# Patient Record
Sex: FEMALE | Race: WHITE | NOT HISPANIC OR LATINO | Employment: OTHER | URBAN - METROPOLITAN AREA
[De-identification: names, ages, dates, MRNs, and addresses within clinical notes are randomized per-mention and may not be internally consistent; named-entity substitution may affect disease eponyms.]

---

## 2018-03-28 ENCOUNTER — ESTABLISHED (OUTPATIENT)
Dept: URBAN - METROPOLITAN AREA CLINIC 35 | Facility: CLINIC | Age: 78
End: 2018-03-28

## 2018-03-28 DIAGNOSIS — Z96.1: ICD-10-CM

## 2018-03-28 DIAGNOSIS — H04.123: ICD-10-CM

## 2018-03-28 PROCEDURE — 1036F TOBACCO NON-USER: CPT

## 2018-03-28 PROCEDURE — 92014 COMPRE OPH EXAM EST PT 1/>: CPT

## 2018-03-28 PROCEDURE — G8427 DOCREV CUR MEDS BY ELIG CLIN: HCPCS

## 2018-03-28 PROCEDURE — G8785 BP SCRN NO PERF AT INTERVAL: HCPCS

## 2018-03-28 PROCEDURE — 92015 DETERMINE REFRACTIVE STATE: CPT

## 2018-03-28 ASSESSMENT — VISUAL ACUITY
OS_SC: 20/25
OS_CC: J1
OD_CC: J1
OD_SC: 20/25

## 2018-03-28 ASSESSMENT — TONOMETRY
OD_IOP_MMHG: 16
OS_IOP_MMHG: 16

## 2019-04-03 ENCOUNTER — ESTABLISHED PATIENT (OUTPATIENT)
Dept: URBAN - METROPOLITAN AREA CLINIC 35 | Facility: CLINIC | Age: 79
End: 2019-04-03

## 2019-04-03 DIAGNOSIS — H04.123: ICD-10-CM

## 2019-04-03 DIAGNOSIS — Z96.1: ICD-10-CM

## 2019-04-03 DIAGNOSIS — H35.363: ICD-10-CM

## 2019-04-03 DIAGNOSIS — H26.491: ICD-10-CM

## 2019-04-03 PROCEDURE — 92014 COMPRE OPH EXAM EST PT 1/>: CPT

## 2019-04-03 PROCEDURE — 92015 DETERMINE REFRACTIVE STATE: CPT

## 2019-04-03 ASSESSMENT — VISUAL ACUITY
OS_SC: 20/30-1
OD_SC: J4
OS_BAT: 20/60 WITH MR
OS_SC: J12
OD_SC: 20/200

## 2019-04-03 ASSESSMENT — TONOMETRY
OS_IOP_MMHG: 16
OD_IOP_MMHG: 16

## 2019-04-18 ENCOUNTER — SURGERY/PROCEDURE (OUTPATIENT)
Dept: URBAN - METROPOLITAN AREA SURGERY 14 | Facility: SURGERY | Age: 79
End: 2019-04-18

## 2019-04-18 ENCOUNTER — H&P (OUTPATIENT)
Dept: URBAN - METROPOLITAN AREA CLINIC 39 | Facility: CLINIC | Age: 79
End: 2019-04-18

## 2019-04-18 DIAGNOSIS — H26.491: ICD-10-CM

## 2019-04-18 DIAGNOSIS — H35.363: ICD-10-CM

## 2019-04-18 DIAGNOSIS — Z96.1: ICD-10-CM

## 2019-04-18 PROCEDURE — 99499 UNLISTED E&M SERVICE: CPT

## 2019-04-18 PROCEDURE — 66821 AFTER CATARACT LASER SURGERY: CPT

## 2019-04-22 ENCOUNTER — YAG POST-OP (OUTPATIENT)
Dept: URBAN - METROPOLITAN AREA CLINIC 35 | Facility: CLINIC | Age: 79
End: 2019-04-22

## 2019-04-22 DIAGNOSIS — H35.363: ICD-10-CM

## 2019-04-22 DIAGNOSIS — Z98.890: ICD-10-CM

## 2019-04-22 DIAGNOSIS — Z96.1: ICD-10-CM

## 2019-04-22 PROCEDURE — 99024 POSTOP FOLLOW-UP VISIT: CPT

## 2019-04-22 ASSESSMENT — VISUAL ACUITY
OU_SC: J2
OD_SC: J3
OD_SC: 20/50-1
OS_SC: 20/25-1
OS_SC: J12
OU_SC: 20/25-1

## 2019-04-22 ASSESSMENT — TONOMETRY
OS_IOP_MMHG: 14
OD_IOP_MMHG: 14

## 2020-03-10 NOTE — PATIENT DISCUSSION
The patient was informed that with [facility] for distance, they will need glasses for all near and intermediate activities after surgery. The patient understands there is a possibility they may need an enhancement after surgery. The patient elects [employer] ([employer]) OS, goal of emmetropia.

## 2020-03-10 NOTE — PATIENT DISCUSSION
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

## 2020-06-24 ENCOUNTER — ESTABLISHED COMPREHENSIVE EXAM (OUTPATIENT)
Dept: URBAN - METROPOLITAN AREA CLINIC 35 | Facility: CLINIC | Age: 80
End: 2020-06-24

## 2020-06-24 DIAGNOSIS — H04.123: ICD-10-CM

## 2020-06-24 DIAGNOSIS — H35.363: ICD-10-CM

## 2020-06-24 PROCEDURE — 92014 COMPRE OPH EXAM EST PT 1/>: CPT

## 2020-06-24 ASSESSMENT — TONOMETRY
OD_IOP_MMHG: 12
OS_IOP_MMHG: 12

## 2020-06-24 ASSESSMENT — VISUAL ACUITY
OS_SC: 20/30
OD_SC: J12
OU_SC: 20/30-1
OD_SC: 20/50
OS_SC: J12

## 2021-06-30 ENCOUNTER — ESTABLISHED COMPREHENSIVE EXAM (OUTPATIENT)
Dept: URBAN - METROPOLITAN AREA CLINIC 35 | Facility: CLINIC | Age: 81
End: 2021-06-30

## 2021-06-30 DIAGNOSIS — Z98.890: ICD-10-CM

## 2021-06-30 DIAGNOSIS — H35.363: ICD-10-CM

## 2021-06-30 DIAGNOSIS — Z96.1: ICD-10-CM

## 2021-06-30 DIAGNOSIS — H04.123: ICD-10-CM

## 2021-06-30 PROCEDURE — 92134 CPTRZ OPH DX IMG PST SGM RTA: CPT

## 2021-06-30 PROCEDURE — 92014 COMPRE OPH EXAM EST PT 1/>: CPT

## 2021-06-30 ASSESSMENT — TONOMETRY
OS_IOP_MMHG: 12
OD_IOP_MMHG: 12

## 2021-06-30 ASSESSMENT — VISUAL ACUITY
OS_SC: J12
OD_SC: 20/50-
OS_SC: 20/40
OD_SC: J8

## 2023-01-24 ENCOUNTER — COMPREHENSIVE EXAM (OUTPATIENT)
Dept: URBAN - METROPOLITAN AREA CLINIC 35 | Facility: CLINIC | Age: 83
End: 2023-01-24

## 2023-01-24 DIAGNOSIS — Z98.890: ICD-10-CM

## 2023-01-24 DIAGNOSIS — H35.363: ICD-10-CM

## 2023-01-24 DIAGNOSIS — H04.123: ICD-10-CM

## 2023-01-24 DIAGNOSIS — Z96.1: ICD-10-CM

## 2023-01-24 PROCEDURE — 92014 COMPRE OPH EXAM EST PT 1/>: CPT

## 2023-01-24 ASSESSMENT — VISUAL ACUITY
OS_SC: J10
OD_SC: 20/40+2
OD_SC: J6
OS_SC: 20/30-2

## 2023-01-24 ASSESSMENT — TONOMETRY
OS_IOP_MMHG: 12
OD_IOP_MMHG: 13

## 2023-01-30 NOTE — PATIENT DISCUSSION
Pt. will try 1-day Acuvue Max Multifocal and see if this will work. She was advised they should help with computer work.

## 2024-01-30 ENCOUNTER — COMPREHENSIVE EXAM (OUTPATIENT)
Dept: URBAN - METROPOLITAN AREA CLINIC 35 | Facility: CLINIC | Age: 84
End: 2024-01-30

## 2024-01-30 DIAGNOSIS — Z96.1: ICD-10-CM

## 2024-01-30 DIAGNOSIS — H35.363: ICD-10-CM

## 2024-01-30 DIAGNOSIS — H04.123: ICD-10-CM

## 2024-01-30 DIAGNOSIS — Z98.890: ICD-10-CM

## 2024-01-30 PROCEDURE — 92014 COMPRE OPH EXAM EST PT 1/>: CPT

## 2024-01-30 ASSESSMENT — VISUAL ACUITY
OD_SC: J8
OS_SC: 20/40+2
OD_SC: 20/60
OS_SC: J5

## 2024-01-30 ASSESSMENT — TONOMETRY
OD_IOP_MMHG: 14
OS_IOP_MMHG: 14

## 2025-02-04 ENCOUNTER — COMPREHENSIVE EXAM (OUTPATIENT)
Age: 85
End: 2025-02-04

## 2025-02-04 DIAGNOSIS — Z98.890: ICD-10-CM

## 2025-02-04 DIAGNOSIS — H04.123: ICD-10-CM

## 2025-02-04 DIAGNOSIS — Z96.1: ICD-10-CM

## 2025-02-04 DIAGNOSIS — H35.363: ICD-10-CM

## 2025-02-04 PROCEDURE — 92014 COMPRE OPH EXAM EST PT 1/>: CPT
